# Patient Record
Sex: FEMALE | Race: WHITE
[De-identification: names, ages, dates, MRNs, and addresses within clinical notes are randomized per-mention and may not be internally consistent; named-entity substitution may affect disease eponyms.]

---

## 2017-01-25 ENCOUNTER — HOSPITAL ENCOUNTER (OUTPATIENT)
Age: 47
Discharge: HOME | End: 2017-01-25
Payer: MEDICAID

## 2017-01-25 DIAGNOSIS — K92.0: ICD-10-CM

## 2017-01-25 DIAGNOSIS — G89.29: ICD-10-CM

## 2017-01-25 DIAGNOSIS — R10.13: Primary | ICD-10-CM

## 2017-02-28 ENCOUNTER — HOSPITAL ENCOUNTER (OUTPATIENT)
Age: 47
Discharge: HOME | End: 2017-02-28
Payer: MEDICAID

## 2017-02-28 DIAGNOSIS — N64.4: Primary | ICD-10-CM

## 2018-04-26 ENCOUNTER — HOSPITAL ENCOUNTER (OUTPATIENT)
Dept: HOSPITAL 76 - LAB.F | Age: 48
Discharge: HOME | End: 2018-04-26
Attending: NURSE PRACTITIONER
Payer: MEDICAID

## 2018-04-26 DIAGNOSIS — J02.9: ICD-10-CM

## 2018-04-26 DIAGNOSIS — R10.13: ICD-10-CM

## 2018-04-26 DIAGNOSIS — I63.9: Primary | ICD-10-CM

## 2018-04-26 DIAGNOSIS — F45.8: ICD-10-CM

## 2018-04-26 LAB
ALBUMIN DIAFP-MCNC: 4.3 G/DL (ref 3.2–5.5)
ALBUMIN/GLOB SERPL: 1.5 {RATIO} (ref 1–2.2)
ALP SERPL-CCNC: 64 IU/L (ref 42–121)
ALT SERPL W P-5'-P-CCNC: 46 IU/L (ref 10–60)
ANION GAP SERPL CALCULATED.4IONS-SCNC: 4 MMOL/L (ref 6–13)
AST SERPL W P-5'-P-CCNC: 28 IU/L (ref 10–42)
BASOPHILS NFR BLD AUTO: 0.1 10^3/UL (ref 0–0.1)
BASOPHILS NFR BLD AUTO: 1.2 %
BILIRUB BLD-MCNC: 0.5 MG/DL (ref 0.2–1)
BUN SERPL-MCNC: 15 MG/DL (ref 6–20)
CALCIUM UR-MCNC: 9 MG/DL (ref 8.5–10.3)
CHLORIDE SERPL-SCNC: 104 MMOL/L (ref 101–111)
CHOLEST SERPL-MCNC: 310 MG/DL
CO2 SERPL-SCNC: 29 MMOL/L (ref 21–32)
CREAT SERPLBLD-SCNC: 0.9 MG/DL (ref 0.4–1)
EOSINOPHIL # BLD AUTO: 0.2 10^3/UL (ref 0–0.7)
EOSINOPHIL NFR BLD AUTO: 3.3 %
ERYTHROCYTE [DISTWIDTH] IN BLOOD BY AUTOMATED COUNT: 12.6 % (ref 12–15)
GFRSERPLBLD MDRD-ARVRAT: 67 ML/MIN/{1.73_M2} (ref 89–?)
GLOBULIN SER-MCNC: 2.8 G/DL (ref 2.1–4.2)
GLUCOSE SERPL-MCNC: 100 MG/DL (ref 70–100)
HDLC SERPL-MCNC: 44 MG/DL
HDLC SERPL: 7 {RATIO} (ref ?–4.4)
HGB UR QL STRIP: 14.4 G/DL (ref 12–16)
LDLC SERPL CALC-MCNC: 190 MG/DL
LDLC/HDLC SERPL: 4.3 {RATIO} (ref ?–4.4)
LYMPHOCYTES # SPEC AUTO: 2.6 10^3/UL (ref 1.5–3.5)
LYMPHOCYTES NFR BLD AUTO: 41.7 %
MCH RBC QN AUTO: 33.4 PG (ref 27–31)
MCHC RBC AUTO-ENTMCNC: 34.3 G/DL (ref 32–36)
MCV RBC AUTO: 97.3 FL (ref 81–99)
MONOCYTES # BLD AUTO: 0.5 10^3/UL (ref 0–1)
MONOCYTES NFR BLD AUTO: 8.3 %
NEUTROPHILS # BLD AUTO: 2.8 10^3/UL (ref 1.5–6.6)
NEUTROPHILS # SNV AUTO: 6.2 X10^3/UL (ref 4.8–10.8)
NEUTROPHILS NFR BLD AUTO: 45.5 %
PDW BLD AUTO: 9.8 FL (ref 7.9–10.8)
PLATELET # BLD: 175 10^3/UL (ref 130–450)
PROT SPEC-MCNC: 7.1 G/DL (ref 6.7–8.2)
RBC MAR: 4.3 10^6/UL (ref 4.2–5.4)
SODIUM SERPLBLD-SCNC: 137 MMOL/L (ref 135–145)
VLDLC SERPL-SCNC: 76 MG/DL

## 2018-04-26 PROCEDURE — 85025 COMPLETE CBC W/AUTO DIFF WBC: CPT

## 2018-04-26 PROCEDURE — 80061 LIPID PANEL: CPT

## 2018-04-26 PROCEDURE — 80053 COMPREHEN METABOLIC PANEL: CPT

## 2018-04-26 PROCEDURE — 36415 COLL VENOUS BLD VENIPUNCTURE: CPT

## 2018-04-26 PROCEDURE — 86308 HETEROPHILE ANTIBODY SCREEN: CPT

## 2018-04-26 PROCEDURE — 83721 ASSAY OF BLOOD LIPOPROTEIN: CPT

## 2018-04-26 PROCEDURE — 84443 ASSAY THYROID STIM HORMONE: CPT

## 2018-11-29 ENCOUNTER — HOSPITAL ENCOUNTER (OUTPATIENT)
Dept: HOSPITAL 76 - LAB.F | Age: 48
Discharge: HOME | End: 2018-11-29
Attending: NURSE PRACTITIONER
Payer: COMMERCIAL

## 2018-11-29 DIAGNOSIS — E78.5: Primary | ICD-10-CM

## 2018-11-29 LAB
CHOLEST SERPL-MCNC: 200 MG/DL
HDLC SERPL-MCNC: 43 MG/DL
HDLC SERPL: 4.7 {RATIO} (ref ?–4.4)
LDLC SERPL CALC-MCNC: 100 MG/DL
LDLC/HDLC SERPL: 2.3 {RATIO} (ref ?–4.4)
VLDLC SERPL-SCNC: 57 MG/DL

## 2018-11-29 PROCEDURE — 36415 COLL VENOUS BLD VENIPUNCTURE: CPT

## 2018-11-29 PROCEDURE — 83721 ASSAY OF BLOOD LIPOPROTEIN: CPT

## 2018-11-29 PROCEDURE — 80061 LIPID PANEL: CPT

## 2020-05-26 ENCOUNTER — HOSPITAL ENCOUNTER (OUTPATIENT)
Dept: HOSPITAL 76 - DI.WCP | Age: 50
Discharge: HOME | End: 2020-05-26
Attending: FAMILY MEDICINE
Payer: COMMERCIAL

## 2020-05-26 DIAGNOSIS — M48.54XA: Primary | ICD-10-CM

## 2020-05-26 DIAGNOSIS — M47.814: ICD-10-CM

## 2020-05-26 PROCEDURE — 72070 X-RAY EXAM THORAC SPINE 2VWS: CPT

## 2020-05-27 NOTE — XRAY REPORT
Reason:  SPRAIN OF THORACIC SPINE

Procedure Date:  05/26/2020   

Accession Number:  [id number] / [id number]                    

Procedure:  WCP - Thoracic Spine 2 View CPT Code:  

 

***Final Report***

 

 

FULL RESULT:

 

 

EXAM:

THORACIC SPINE RADIOGRAPHY

 

EXAM DATE: 5/26/2020 03:10 PM.

 

CLINICAL HISTORY: SPRAIN OF THORACIC SPINE.

 

COMPARISON: CHEST 2 VIEW PA/LAT 04/27/2016 3:10 PM

 04/27/2016 4:24 PM.

 

TECHNIQUE: 2 views.

 

FINDINGS:

Alignment: Normal. No spondylolisthesis or scoliosis.

 

Bones: Mild anterior wedging T6

 

Disks: Osteophyte formation diffusely.

 

Soft Tissues: Normal. The visualized lungs and cardiomediastinal 

silhouette are normal.

IMPRESSION:

1. Mild anterior wedging T6.

2. DJD

 

[facility]

## 2020-10-22 ENCOUNTER — HOSPITAL ENCOUNTER (OUTPATIENT)
Dept: HOSPITAL 76 - COV | Age: 50
Discharge: HOME | End: 2020-10-22
Attending: FAMILY MEDICINE
Payer: MEDICAID

## 2020-10-22 DIAGNOSIS — R09.81: ICD-10-CM

## 2020-10-22 DIAGNOSIS — J02.9: ICD-10-CM

## 2020-10-22 DIAGNOSIS — R53.83: ICD-10-CM

## 2020-10-22 DIAGNOSIS — R06.02: ICD-10-CM

## 2020-10-22 DIAGNOSIS — R50.9: Primary | ICD-10-CM

## 2020-10-22 DIAGNOSIS — Z20.828: ICD-10-CM

## 2020-10-22 DIAGNOSIS — R05: ICD-10-CM

## 2020-10-22 DIAGNOSIS — R19.7: ICD-10-CM

## 2020-12-29 ENCOUNTER — HOSPITAL ENCOUNTER (OUTPATIENT)
Dept: HOSPITAL 76 - COV | Age: 50
Discharge: HOME | End: 2020-12-29
Attending: FAMILY MEDICINE
Payer: MEDICAID

## 2020-12-29 DIAGNOSIS — J34.89: ICD-10-CM

## 2020-12-29 DIAGNOSIS — R09.81: ICD-10-CM

## 2020-12-29 DIAGNOSIS — R07.0: Primary | ICD-10-CM

## 2020-12-29 DIAGNOSIS — Z20.828: ICD-10-CM

## 2021-03-30 ENCOUNTER — HOSPITAL ENCOUNTER (OUTPATIENT)
Dept: HOSPITAL 76 - DI.S | Age: 51
Discharge: HOME | End: 2021-03-30
Attending: FAMILY MEDICINE
Payer: MEDICAID

## 2021-03-30 ENCOUNTER — HOSPITAL ENCOUNTER (OUTPATIENT)
Dept: HOSPITAL 76 - LAB.S | Age: 51
Discharge: HOME | End: 2021-03-30
Attending: FAMILY MEDICINE
Payer: MEDICAID

## 2021-03-30 DIAGNOSIS — R05: Primary | ICD-10-CM

## 2021-03-30 DIAGNOSIS — R10.11: Primary | ICD-10-CM

## 2021-03-30 DIAGNOSIS — F17.200: ICD-10-CM

## 2021-03-30 LAB
ALBUMIN DIAFP-MCNC: 4.2 G/DL (ref 3.2–5.5)
ALBUMIN/GLOB SERPL: 1.4 {RATIO} (ref 1–2.2)
ALP SERPL-CCNC: 59 IU/L (ref 42–121)
ALT SERPL W P-5'-P-CCNC: 28 IU/L (ref 10–60)
ANION GAP SERPL CALCULATED.4IONS-SCNC: 13 MMOL/L (ref 6–13)
AST SERPL W P-5'-P-CCNC: 20 IU/L (ref 10–42)
BASOPHILS NFR BLD AUTO: 0.1 10^3/UL (ref 0–0.1)
BASOPHILS NFR BLD AUTO: 1.2 %
BILIRUB BLD-MCNC: 0.3 MG/DL (ref 0.2–1)
BUN SERPL-MCNC: 15 MG/DL (ref 6–20)
CALCIUM UR-MCNC: 10.6 MG/DL (ref 8.5–10.3)
CHLORIDE SERPL-SCNC: 102 MMOL/L (ref 101–111)
CO2 SERPL-SCNC: 27 MMOL/L (ref 21–32)
CREAT SERPLBLD-SCNC: 0.9 MG/DL (ref 0.4–1)
EOSINOPHIL # BLD AUTO: 0.2 10^3/UL (ref 0–0.7)
EOSINOPHIL NFR BLD AUTO: 2.4 %
ERYTHROCYTE [DISTWIDTH] IN BLOOD BY AUTOMATED COUNT: 13 % (ref 12–15)
GFRSERPLBLD MDRD-ARVRAT: 66 ML/MIN/{1.73_M2} (ref 89–?)
GLOBULIN SER-MCNC: 2.9 G/DL (ref 2.1–4.2)
GLUCOSE SERPL-MCNC: 124 MG/DL (ref 70–100)
HCT VFR BLD AUTO: 44.4 % (ref 37–47)
HGB UR QL STRIP: 14.8 G/DL (ref 12–16)
LIPASE SERPL-CCNC: 47 U/L (ref 22–51)
LYMPHOCYTES # SPEC AUTO: 2.2 10^3/UL (ref 1.5–3.5)
LYMPHOCYTES NFR BLD AUTO: 32.5 %
MCH RBC QN AUTO: 32.5 PG (ref 27–31)
MCHC RBC AUTO-ENTMCNC: 33.3 G/DL (ref 32–36)
MCV RBC AUTO: 97.6 FL (ref 81–99)
MONOCYTES # BLD AUTO: 0.5 10^3/UL (ref 0–1)
MONOCYTES NFR BLD AUTO: 7.5 %
NEUTROPHILS # BLD AUTO: 3.8 10^3/UL (ref 1.5–6.6)
NEUTROPHILS # SNV AUTO: 6.7 X10^3/UL (ref 4.8–10.8)
NEUTROPHILS NFR BLD AUTO: 56.1 %
NRBC # BLD AUTO: 0 /100WBC
NRBC # BLD AUTO: 0 X10^3/UL
PDW BLD AUTO: 11.5 FL (ref 7.9–10.8)
PLATELET # BLD: 218 10^3/UL (ref 130–450)
POTASSIUM SERPL-SCNC: 3.7 MMOL/L (ref 3.5–5)
PROT SPEC-MCNC: 7.1 G/DL (ref 6.7–8.2)
RBC MAR: 4.55 10^6/UL (ref 4.2–5.4)
SODIUM SERPLBLD-SCNC: 142 MMOL/L (ref 135–145)

## 2021-03-30 PROCEDURE — 83690 ASSAY OF LIPASE: CPT

## 2021-03-30 PROCEDURE — 36415 COLL VENOUS BLD VENIPUNCTURE: CPT

## 2021-03-30 PROCEDURE — 85025 COMPLETE CBC W/AUTO DIFF WBC: CPT

## 2021-03-30 PROCEDURE — 80053 COMPREHEN METABOLIC PANEL: CPT

## 2021-03-30 NOTE — XRAY REPORT
PROCEDURE:  Chest 2 View X-Ray

 

INDICATIONS:  COUGH, NICOTINE DEPENDENCE

 

TECHNIQUE:  2 view(s) of the chest.  

 

COMPARISON:  4/27/2016.

 

FINDINGS:  

 

Surgical changes and devices:  None.  

 

Lungs and pleura:  No pleural effusions or pneumothorax.  Stable appearance of minimal diffuse inters
titial prominence likely chronic in etiology. Lungs are clear.  

 

Mediastinum:  Mediastinal contours are normal.  Heart size is normal.  

 

Bones and chest wall:  No suspicious bony abnormalities.  Soft tissues appear unremarkable.  

 

IMPRESSION:  

Stable examination of the chest without acute cardiopulmonary abnormalities.

 

Reviewed by: Sb Dickerson MD on 3/30/2021 5:41 PM PDT

Approved by: Sb Dickerson MD on 3/30/2021 5:41 PM PDT

 

 

Station ID:  [id number]

## 2021-04-06 ENCOUNTER — HOSPITAL ENCOUNTER (OUTPATIENT)
Dept: HOSPITAL 76 - DI | Age: 51
Discharge: HOME | End: 2021-04-06
Attending: FAMILY MEDICINE
Payer: MEDICAID

## 2021-04-06 DIAGNOSIS — K76.0: Primary | ICD-10-CM

## 2021-04-06 NOTE — ULTRASOUND REPORT
PROCEDURE:  Abdomen Complete

 

INDICATIONS:  RUQ ABD PAIN

 

TECHNIQUE:  

Real-time scanning was performed of the abdominal and retroperitoneal organs, with image documentatio
n.  

 

COMPARISON:  None.

 

FINDINGS:  

 

Liver: Increased hepatic parenchymal echogenicity indicative of diffuse hepatic steatosis. Otherwise 
normal appearance of the liver.  

 

Gallbladder: Normally distended gallbladder. No gallbladder wall thickening or pericholecystic fluid.
 No shadowing gallstones or sludge.

 

Biliary ducts:  Intrahepatic bile ducts are non-dilated.  Extrahepatic bile duct caliber measures 5 m
m.  Normal is 6-7 mm or less in diameter, or 10 mm or less post-cholecystectomy.  

 

Pancreas: Partially obscured by bowel gas. Visualized portions are normal.

 

Spleen:  Spleen is normal in size and homogeneous in echotexture.  

 

Kidneys: Normal size and appearance of both kidneys. No shadowing calculus or hydronephrosis.

 

Aorta:  Visualized aorta is normal in caliber at less than 3 cm.  

 

Iliacs: Obscured by bowel gas. IVC:  Intrahepatic inferior vena cava is patent.  

 

Miscellaneous:  No free abdominal fluid.  

 

IMPRESSION:  

Mild to moderate hepatic steatosis.

 

Otherwise normal right upper quadrant abdominal ultrasound.

 

Reviewed by: Tutu Pizarro MD on 4/6/2021 10:44 AM PDT

Approved by: Tutu Pizarro MD on 4/6/2021 10:44 AM PDT

 

 

Station ID:  [id number]

## 2021-04-17 ENCOUNTER — HOSPITAL ENCOUNTER (OUTPATIENT)
Dept: HOSPITAL 76 - RT | Age: 51
Discharge: HOME | End: 2021-04-17
Attending: FAMILY MEDICINE
Payer: MEDICAID

## 2021-04-17 DIAGNOSIS — J44.9: Primary | ICD-10-CM

## 2021-04-17 PROCEDURE — 94664 DEMO&/EVAL PT USE INHALER: CPT

## 2021-04-17 PROCEDURE — 94060 EVALUATION OF WHEEZING: CPT

## 2021-04-17 PROCEDURE — 94729 DIFFUSING CAPACITY: CPT

## 2021-05-20 ENCOUNTER — HOSPITAL ENCOUNTER (OUTPATIENT)
Dept: HOSPITAL 76 - DI | Age: 51
Discharge: HOME | End: 2021-05-20
Attending: INTERNAL MEDICINE
Payer: MEDICAID

## 2021-05-20 DIAGNOSIS — R10.9: Primary | ICD-10-CM

## 2021-05-20 PROCEDURE — 74177 CT ABD & PELVIS W/CONTRAST: CPT

## 2021-05-20 NOTE — CT REPORT
PROCEDURE:  Abdomen/Pelvis W

 

INDICATIONS:  ABD PAIN

 

CONTRAST:  IV CONTRAST: Optiray 320 ml: 100 PO CONTRAST: Isovue 300 ml50

 

TECHNIQUE:  

After the administration of oral and intravenous contrast, 5 mm thick sections acquired from the diap
hragms to the symphysis.  5 mm thick coronal and sagittal reformats were acquired.  For radiation dos
e reduction, the following was used:  automated exposure control, adjustment of mA and/or kV accordin
g to patient size.  

 

COMPARISON:  None.

 

FINDINGS:  

Image quality:  Excellent.  

 

ABDOMEN:  

Lung bases:  Lung bases are clear.  Heart size is normal.  

 

Solid organs:  Liver and spleen are normal in size and enhancement.  Gallbladder      Biliary system 
is non dilated.  Pancreas enhances normally.  No adrenal nodules.  Kidneys demonstrate normal size an
d enhancement, without hydronephrosis.  

 

Peritoneum and bowel:  Bowel loops demonstrate normal wall thickness and caliber.  No free fluid or a
ir.  

 

Nodes and vessels:  No retroperitoneal or mesenteric adenopathy by size criteria.  Aorta and inferior
 vena cava are normal in size.  

 

Miscellaneous: There is a small fat-containing umbilical hernia.  

 

 

PELVIS:  

Genitourinary:  Uterus is absent. Ovaries are not visualized. Bladder wall thickness is normal.  

 

Miscellaneous:  No inguinal hernias or adenopathy.  

 

Bones:  No suspicious bony lesions.  No vertebral body compression fractures.  Mild degenerative haas
ges are noted in the lower thoracic and lumbar spine.

 

IMPRESSION:  

 

1. No acute abnormalities in abdomen or pelvis.

 

Reviewed by: Cindi Calvert MD on 5/20/2021 11:48 AM PDT

Approved by: Cindi Calvert MD on 5/20/2021 11:48 AM PDT

 

 

Station ID:  [id number]

## 2021-05-21 ENCOUNTER — HOSPITAL ENCOUNTER (OUTPATIENT)
Dept: HOSPITAL 76 - SDS | Age: 51
Discharge: HOME | End: 2021-05-21
Attending: SURGERY
Payer: MEDICAID

## 2021-05-21 VITALS — DIASTOLIC BLOOD PRESSURE: 61 MMHG | SYSTOLIC BLOOD PRESSURE: 109 MMHG

## 2021-05-21 DIAGNOSIS — F17.210: ICD-10-CM

## 2021-05-21 DIAGNOSIS — L40.9: ICD-10-CM

## 2021-05-21 DIAGNOSIS — E66.01: ICD-10-CM

## 2021-05-21 DIAGNOSIS — R10.11: ICD-10-CM

## 2021-05-21 DIAGNOSIS — I49.9: ICD-10-CM

## 2021-05-21 DIAGNOSIS — Z92.83: ICD-10-CM

## 2021-05-21 DIAGNOSIS — K62.1: ICD-10-CM

## 2021-05-21 DIAGNOSIS — Z87.11: ICD-10-CM

## 2021-05-21 DIAGNOSIS — Z12.11: Primary | ICD-10-CM

## 2021-05-21 PROCEDURE — 0DBP8ZX EXCISION OF RECTUM, VIA NATURAL OR ARTIFICIAL OPENING ENDOSCOPIC, DIAGNOSTIC: ICD-10-PCS | Performed by: SURGERY

## 2021-05-21 PROCEDURE — 45380 COLONOSCOPY AND BIOPSY: CPT

## 2021-05-21 NOTE — ANESTHESIA
Pre-Anesthesia VS, & Labs





- Diagnosis





screening





- Procedure





colonoscopy


Vital Signs: 





                                        











Temp Pulse Resp BP Pulse Ox


 


 36.7 C   79   16   128/76   98 


 


 05/21/21 12:10  05/21/21 12:10  05/21/21 12:10  05/21/21 12:10  05/21/21 12:10











Height: 5 ft 1 in


Weight (kg): 97 kg


Body Mass Index: 40.4


BMI Classification: Morbidly Obese





- NPO


>8 hours





- Pregnancy


Is Patient Pregnant?: No





- Lab Results


Current Lab Results: 





Laboratory Tests





05/21/21 12:30: POC Whole Bld Glucose 90











Home Medications and Allergies


Home Medications: 


Ambulatory Orders





Albuterol 1 puffs IH DAILY 05/20/21 


Triamcinolone 0.1% Cream [Kenalog 0.1% Cream] 1 appful TOP DAILY 05/20/21 











                                        





Albuterol 1 puffs IH DAILY 05/20/21 


Triamcinolone 0.1% Cream [Kenalog 0.1% Cream] 1 appful TOP DAILY 05/20/21 








Allergies/Adverse Reactions: 


                                    Allergies











Allergy/AdvReac Type Severity Reaction Status Date / Time


 


ct dye AdvReac  Anaphylaxis Uncoded 05/21/21 12:22














Anes History & Medical History





- Anesthetic History


Anesthesia Complications: reports: No previous complications


Family history of Anesthesia Complications: Denies


Family history of Malignant Hyperthermia: Denies





- Medical History


Cardiovascular: reports: Arrhythmia


Pulmonary: reports: Other


Gastrointestinal: reports: GI bleed, Ulcers, Other


Urinary: reports: None


Musculoskeletal: reports: Osteoarthritis, Chronic back pain


Endocrine/Autoimmune: reports: None


Skin: reports: Psoriasis





- Surgical History


Gynecologic: reports: Hysterectomy





Exam


General: Alert, Oriented x3, Cooperative


Dental: WNL


Mouth Opening: 3 Fingerbreadth


Neck Mobility: Normal


Mallampati classification: II


Thyromental Distance: 4-6 cm


Respiratory: Lungs clear


Cardiovascular: Regular rate





Plan


Anesthesia Type: Total IV


Consent for Procedure(s) Verified and Reviewed: Yes


Code Status: Attempt Resuscitation


ASA classification: 2-Mild systemic disease


Is this case an emergency?: No

## 2021-05-21 NOTE — ANESTHESIA POST OP EVALUATION
Anesthesia Post Eval





- Post Anesthesia Eval


Vitals: 





                                Last Vital Signs











Temp  36.3 C L  05/21/21 14:00


 


Pulse  71   05/21/21 14:20


 


Resp  16   05/21/21 14:20


 


BP  109/61   05/21/21 14:20


 


Pulse Ox  97   05/21/21 14:20











CV Function Including HR & BP: Stable


Pain Control: Satisfactory


Nausea & Vomiting: Negative


Mental Status: Baseline


Respiratory Status: Airway Patent


Hydration Status: Satisfactory


Anesthesia Complications: None

## 2021-06-09 ENCOUNTER — HOSPITAL ENCOUNTER (OUTPATIENT)
Dept: HOSPITAL 76 - LAB.S | Age: 51
Discharge: HOME | End: 2021-06-09
Attending: INTERNAL MEDICINE
Payer: MEDICAID

## 2021-06-09 DIAGNOSIS — E78.5: ICD-10-CM

## 2021-06-09 DIAGNOSIS — R10.9: Primary | ICD-10-CM

## 2021-06-09 LAB
ALBUMIN DIAFP-MCNC: 4.5 G/DL (ref 3.2–5.5)
ALBUMIN/GLOB SERPL: 1.5 {RATIO} (ref 1–2.2)
ALP SERPL-CCNC: 61 IU/L (ref 42–121)
ALT SERPL W P-5'-P-CCNC: 28 IU/L (ref 10–60)
ANION GAP SERPL CALCULATED.4IONS-SCNC: 10 MMOL/L (ref 6–13)
AST SERPL W P-5'-P-CCNC: 22 IU/L (ref 10–42)
BASOPHILS NFR BLD AUTO: 0.1 10^3/UL (ref 0–0.1)
BASOPHILS NFR BLD AUTO: 1.1 %
BILIRUB BLD-MCNC: 0.7 MG/DL (ref 0.2–1)
BUN SERPL-MCNC: 19 MG/DL (ref 6–20)
CALCIUM UR-MCNC: 9.4 MG/DL (ref 8.5–10.3)
CHLORIDE SERPL-SCNC: 102 MMOL/L (ref 101–111)
CHOLEST SERPL-MCNC: 342 MG/DL
CO2 SERPL-SCNC: 28 MMOL/L (ref 21–32)
CREAT SERPLBLD-SCNC: 0.8 MG/DL (ref 0.4–1)
EOSINOPHIL # BLD AUTO: 0.2 10^3/UL (ref 0–0.7)
EOSINOPHIL NFR BLD AUTO: 3.1 %
ERYTHROCYTE [DISTWIDTH] IN BLOOD BY AUTOMATED COUNT: 12.7 % (ref 12–15)
GFRSERPLBLD MDRD-ARVRAT: 75 ML/MIN/{1.73_M2} (ref 89–?)
GLOBULIN SER-MCNC: 3 G/DL (ref 2.1–4.2)
GLUCOSE SERPL-MCNC: 106 MG/DL (ref 70–100)
HCT VFR BLD AUTO: 45.8 % (ref 37–47)
HDLC SERPL-MCNC: 53 MG/DL
HDLC SERPL: 6.5 {RATIO} (ref ?–4.4)
HGB UR QL STRIP: 15 G/DL (ref 12–16)
LDLC SERPL CALC-MCNC: 219 MG/DL
LDLC/HDLC SERPL: 4.1 {RATIO} (ref ?–4.4)
LYMPHOCYTES # SPEC AUTO: 2.6 10^3/UL (ref 1.5–3.5)
LYMPHOCYTES NFR BLD AUTO: 42 %
MCH RBC QN AUTO: 32.5 PG (ref 27–31)
MCHC RBC AUTO-ENTMCNC: 32.8 G/DL (ref 32–36)
MCV RBC AUTO: 99.3 FL (ref 81–99)
MONOCYTES # BLD AUTO: 0.4 10^3/UL (ref 0–1)
MONOCYTES NFR BLD AUTO: 6.7 %
NEUTROPHILS # BLD AUTO: 2.9 10^3/UL (ref 1.5–6.6)
NEUTROPHILS # SNV AUTO: 6.1 X10^3/UL (ref 4.8–10.8)
NEUTROPHILS NFR BLD AUTO: 46.6 %
NRBC # BLD AUTO: 0 /100WBC
NRBC # BLD AUTO: 0 X10^3/UL
PDW BLD AUTO: 11.3 FL (ref 7.9–10.8)
PLATELET # BLD: 198 10^3/UL (ref 130–450)
POTASSIUM SERPL-SCNC: 4.1 MMOL/L (ref 3.5–5)
PROT SPEC-MCNC: 7.5 G/DL (ref 6.7–8.2)
RBC MAR: 4.61 10^6/UL (ref 4.2–5.4)
SODIUM SERPLBLD-SCNC: 140 MMOL/L (ref 135–145)
TRIGL P FAST SERPL-MCNC: 352 MG/DL
VLDLC SERPL-SCNC: 70 MG/DL

## 2021-06-09 PROCEDURE — 80061 LIPID PANEL: CPT

## 2021-06-09 PROCEDURE — 80053 COMPREHEN METABOLIC PANEL: CPT

## 2021-06-09 PROCEDURE — 36415 COLL VENOUS BLD VENIPUNCTURE: CPT

## 2021-06-09 PROCEDURE — 83721 ASSAY OF BLOOD LIPOPROTEIN: CPT

## 2021-06-09 PROCEDURE — 85025 COMPLETE CBC W/AUTO DIFF WBC: CPT

## 2021-06-09 PROCEDURE — 85651 RBC SED RATE NONAUTOMATED: CPT
